# Patient Record
Sex: MALE | URBAN - METROPOLITAN AREA
[De-identification: names, ages, dates, MRNs, and addresses within clinical notes are randomized per-mention and may not be internally consistent; named-entity substitution may affect disease eponyms.]

---

## 2022-03-09 ENCOUNTER — IMPORTED ENCOUNTER (OUTPATIENT)
Dept: URBAN - METROPOLITAN AREA CLINIC 31 | Facility: CLINIC | Age: 67
End: 2022-03-09

## 2022-03-09 PROBLEM — H40.013: Noted: 2022-03-09

## 2022-03-09 PROBLEM — H11.001: Noted: 2022-03-09

## 2022-03-09 PROBLEM — H25.813: Noted: 2022-03-09

## 2022-03-09 PROCEDURE — 99204 OFFICE O/P NEW MOD 45 MIN: CPT

## 2022-03-09 PROCEDURE — 92133 CPTRZD OPH DX IMG PST SGM ON: CPT

## 2022-03-09 PROCEDURE — 92015 DETERMINE REFRACTIVE STATE: CPT

## 2022-03-09 NOTE — PATIENT DISCUSSION
1.  Discussed the risks benefits alternatives and limitations of cataract surgery including infection bleeding loss of vision retinal tears detachment. The patient stated a full understanding and a desire to proceed with the procedure in both eyes. Refractive options were reviewed. Patient has elected to be optimized for distance vision in both eyes. The patient will still need glasses for reading and to possibly fine tune distance vision. Schedule KPE/IOL OD/OS. Will need full admit pt to think about will call to schedule if desires Sx.2.  Glaucoma suspect OU - Increased C/D OS and borderline IOP OU. OCT Nerve today. IOP and VF 4 mos. 3. Pterygium OD --Discussed that pterygium is caused by the cumulative effect of sun exposure over the patient's life. UV protection with the use of sunglasses and/or hat is important to prevent progression. Artificial tears prn. Monitor for possible worsening. Return for an appointment in 4 months for pressure check. VF 24-2. Standard. with Dr. Ángel Sarabia.

## 2022-04-01 ASSESSMENT — VISUAL ACUITY
OS_CC: 20/70+1
OS_PH: SC 20/50 +2
OS_SC: J1
OD_PH: SC 20/40
OD_SC: J1
OD_CC: 20/70+2

## 2022-04-01 ASSESSMENT — TONOMETRY
OS_IOP_MMHG: 19
OD_IOP_MMHG: 21